# Patient Record
Sex: FEMALE | Race: WHITE | Employment: FULL TIME | URBAN - METROPOLITAN AREA
[De-identification: names, ages, dates, MRNs, and addresses within clinical notes are randomized per-mention and may not be internally consistent; named-entity substitution may affect disease eponyms.]

---

## 2017-04-26 PROCEDURE — 87081 CULTURE SCREEN ONLY: CPT | Performed by: NURSE PRACTITIONER

## 2017-11-02 PROCEDURE — 87389 HIV-1 AG W/HIV-1&-2 AB AG IA: CPT | Performed by: FAMILY MEDICINE

## 2017-11-02 PROCEDURE — 87591 N.GONORRHOEAE DNA AMP PROB: CPT | Performed by: FAMILY MEDICINE

## 2017-11-02 PROCEDURE — 86803 HEPATITIS C AB TEST: CPT | Performed by: FAMILY MEDICINE

## 2017-11-02 PROCEDURE — 86780 TREPONEMA PALLIDUM: CPT | Performed by: FAMILY MEDICINE

## 2017-11-02 PROCEDURE — 87340 HEPATITIS B SURFACE AG IA: CPT | Performed by: FAMILY MEDICINE

## 2017-11-02 PROCEDURE — 87491 CHLMYD TRACH DNA AMP PROBE: CPT | Performed by: FAMILY MEDICINE

## 2017-11-29 PROCEDURE — 88175 CYTOPATH C/V AUTO FLUID REDO: CPT | Performed by: OBSTETRICS & GYNECOLOGY

## 2017-11-29 PROCEDURE — 87624 HPV HI-RISK TYP POOLED RSLT: CPT | Performed by: OBSTETRICS & GYNECOLOGY

## 2018-03-13 PROBLEM — H40.003 GLAUCOMA SUSPECT OF BOTH EYES: Status: ACTIVE | Noted: 2018-03-13

## 2019-05-07 PROBLEM — M54.32 SCIATICA OF LEFT SIDE: Status: ACTIVE | Noted: 2019-05-07

## 2019-05-18 ENCOUNTER — HOSPITAL ENCOUNTER (EMERGENCY)
Facility: HOSPITAL | Age: 44
Discharge: HOME | End: 2019-05-18
Attending: EMERGENCY MEDICINE
Payer: COMMERCIAL

## 2019-05-18 VITALS
SYSTOLIC BLOOD PRESSURE: 98 MMHG | TEMPERATURE: 98 F | OXYGEN SATURATION: 99 % | HEART RATE: 72 BPM | RESPIRATION RATE: 13 BRPM | DIASTOLIC BLOOD PRESSURE: 62 MMHG

## 2019-05-18 DIAGNOSIS — M54.42 ACUTE LEFT-SIDED LOW BACK PAIN WITH LEFT-SIDED SCIATICA: Primary | ICD-10-CM

## 2019-05-18 PROCEDURE — 99283 EMERGENCY DEPT VISIT LOW MDM: CPT

## 2019-05-18 PROCEDURE — 96372 THER/PROPH/DIAG INJ SC/IM: CPT

## 2019-05-18 RX ORDER — CYCLOBENZAPRINE HCL 10 MG
10 TABLET ORAL 3 TIMES DAILY PRN
Qty: 20 TABLET | Refills: 0 | Status: SHIPPED | OUTPATIENT
Start: 2019-05-18 | End: 2019-05-25

## 2019-05-18 RX ORDER — KETOROLAC TROMETHAMINE 30 MG/ML
60 INJECTION, SOLUTION INTRAMUSCULAR; INTRAVENOUS ONCE
Status: COMPLETED | OUTPATIENT
Start: 2019-05-18 | End: 2019-05-18

## 2019-05-18 NOTE — ED INITIAL ASSESSMENT (HPI)
Per patient, \"I've been having sciatic back pain\". Pain for several weeks on the left side and radiates down leg. History of sciatic pain and has already finished a steroid pack from her PCP.

## 2019-05-18 NOTE — ED PROVIDER NOTES
Patient Seen in: [facility] Emergency Department    History   Patient presents with:  Back Pain (musculoskeletal)    Stated Complaint: back pain    HPI    66-year-old female with history of left-sided sciatica presents for evaluation of low back tamia Alert, oriented, no apparent distress  HEENT: Atraumatic, normocephalic. Pupils equal reactive. Extraocular motions intact. Neck: Supple  Lungs: Clear to auscultation bilaterally. Heart: Regular rate and rhythm. Abdomen: Soft, nontender. No mass.   Sanjay Lozoya

## 2019-05-20 ENCOUNTER — TELEPHONE (OUTPATIENT)
Dept: SURGERY | Facility: CLINIC | Age: 44
End: 2019-05-20

## 2019-05-20 ENCOUNTER — HOSPITAL ENCOUNTER (INPATIENT)
Facility: HOSPITAL | Age: 44
LOS: 1 days | Discharge: HOME | DRG: 552 | End: 2019-05-22
Attending: EMERGENCY MEDICINE | Admitting: HOSPITALIST
Payer: COMMERCIAL

## 2019-05-20 ENCOUNTER — APPOINTMENT (OUTPATIENT)
Dept: MRI IMAGING | Facility: HOSPITAL | Age: 44
DRG: 552 | End: 2019-05-20
Attending: EMERGENCY MEDICINE
Payer: COMMERCIAL

## 2019-05-20 DIAGNOSIS — M54.32 SCIATICA OF LEFT SIDE: ICD-10-CM

## 2019-05-20 DIAGNOSIS — M51.26 HERNIATED INTERVERTEBRAL DISC OF LUMBAR SPINE: ICD-10-CM

## 2019-05-20 DIAGNOSIS — M54.59 INTRACTABLE LOW BACK PAIN: Primary | ICD-10-CM

## 2019-05-20 PROCEDURE — 99223 1ST HOSP IP/OBS HIGH 75: CPT | Performed by: ANESTHESIOLOGY

## 2019-05-20 PROCEDURE — 72148 MRI LUMBAR SPINE W/O DYE: CPT | Performed by: EMERGENCY MEDICINE

## 2019-05-20 RX ORDER — ONDANSETRON 2 MG/ML
4 INJECTION INTRAMUSCULAR; INTRAVENOUS EVERY 6 HOURS PRN
Status: DISCONTINUED | OUTPATIENT
Start: 2019-05-20 | End: 2019-05-22

## 2019-05-20 RX ORDER — MORPHINE SULFATE 4 MG/ML
2 INJECTION, SOLUTION INTRAMUSCULAR; INTRAVENOUS EVERY 2 HOUR PRN
Status: DISCONTINUED | OUTPATIENT
Start: 2019-05-20 | End: 2019-05-21

## 2019-05-20 RX ORDER — LORAZEPAM 2 MG/ML
1 INJECTION INTRAMUSCULAR ONCE
Status: COMPLETED | OUTPATIENT
Start: 2019-05-20 | End: 2019-05-20

## 2019-05-20 RX ORDER — CYCLOBENZAPRINE HCL 10 MG
10 TABLET ORAL 3 TIMES DAILY PRN
Status: DISCONTINUED | OUTPATIENT
Start: 2019-05-20 | End: 2019-05-22

## 2019-05-20 RX ORDER — MULTIVITAMIN
1 TABLET ORAL DAILY
COMMUNITY
End: 2021-06-03

## 2019-05-20 RX ORDER — MORPHINE SULFATE 4 MG/ML
4 INJECTION, SOLUTION INTRAMUSCULAR; INTRAVENOUS ONCE
Status: COMPLETED | OUTPATIENT
Start: 2019-05-20 | End: 2019-05-20

## 2019-05-20 RX ORDER — MORPHINE SULFATE 4 MG/ML
4 INJECTION, SOLUTION INTRAMUSCULAR; INTRAVENOUS EVERY 2 HOUR PRN
Status: DISCONTINUED | OUTPATIENT
Start: 2019-05-20 | End: 2019-05-21

## 2019-05-20 RX ORDER — HYDROCODONE BITARTRATE AND ACETAMINOPHEN 5; 325 MG/1; MG/1
1 TABLET ORAL EVERY 4 HOURS PRN
Status: DISCONTINUED | OUTPATIENT
Start: 2019-05-20 | End: 2019-05-21

## 2019-05-20 RX ORDER — LEVOTHYROXINE SODIUM 88 UG/1
88 TABLET ORAL
Status: DISCONTINUED | OUTPATIENT
Start: 2019-05-20 | End: 2019-05-22

## 2019-05-20 RX ORDER — ONDANSETRON 2 MG/ML
4 INJECTION INTRAMUSCULAR; INTRAVENOUS ONCE
Status: COMPLETED | OUTPATIENT
Start: 2019-05-20 | End: 2019-05-20

## 2019-05-20 NOTE — PROGRESS NOTES
[facility]  Report of Consultation    Sheri Wei Patient Status:  Observation    1975 MRN [id number]   [facility] 3SW-A Attending Marjorie Resendiz MD   Hosp Day # 0 PCP Leona Cleveland DO     Date of Admission:  2019  Ricardo (NORCO) 5-325 MG per tab 1 tablet, 1 tablet, Oral, Q4H PRN  •  Levothyroxine Sodium (SYNTHROID, LEVOTHROID) tab 88 mcg, 88 mcg, Oral, Before breakfast    Review of Systems:  Pertinent items are noted in HPI.     Physical Exam:  Blood pressure 104/59, pulse posterior displacement of the descending left L5 nerve root. Clinical correlation for a left L5 radiculopathy is suggested. Mild facet hypertrophy. Minimal central canal stenosis.   The   disc protrusion also abuts the right L5 nerve root but without sig

## 2019-05-20 NOTE — H&P
[facility] Hospitalist History and Physical      Patient presents with:  Back Pain (musculoskeletal)       PCP: Shannon Villalobos DO      History of Present Illness: Patient is a 37year old female with PMH sig for hypothyroidism and known sciatica presented to the Fam Hx  Family History   Problem Relation Age of Onset   • Cancer Father         Lung Cancer,    • Other (Other) Mother         COPD       Review of Systems  Comprehensive ROS reviewed and negative except for what is stated in HPI.       OBJECTI Isa Rosen MD            Result Date: 5/20/2019  PROCEDURE:  MRI SPINE LUMBAR (CPT=72148)  COMPARISON:  None. INDICATIONS:  eval for radicular symptoms  TECHNIQUE:  Multiplanar T1 and T2 weighted images including fat suppression sequences.   Images descending left L5 nerve root. Clinical correlation for right L2 and/or left L5 radiculopathy is suggested. The bladder is  prominent size. Clinical correlation for urinary retention is recommended.   Please see above for further details regarding the de

## 2019-05-20 NOTE — ED PROVIDER NOTES
Patient Seen in: [facility] Emergency Department    History   Patient presents with:  Back Pain (musculoskeletal)    Stated Complaint: back pain    HPI    42-year-old female presents emergency department who had called the ambulance because she was Patient is alert and in moderate to severe pain distress  HEENT: Pupils equal react to light extraocular muscles intact no scleral icterus, mucous membranes are moist, there is no erythema or exudate in the posterior pharynx  Neck: Supple no JVD no lymphad the descending left L5 nerve root. Clinical correlation for right L2 and/or left L5 radiculopathy is suggested. The bladder is  prominent size. Clinical correlation for urinary retention is recommended.   Please see above for further details regarding th

## 2019-05-20 NOTE — TELEPHONE ENCOUNTER
Spoke with Dr. Belinda Bhardwaj who is willing to do the left S1 selective nerve root block requested by [facility] spine on Wednesday.

## 2019-05-20 NOTE — PROGRESS NOTES
Dr. Peri Miller seen patient at the bedside. He discussed MRI results with patient. Received order to consult pain service for [facility] with specific order for Left S1 Selective Nerve Root Block.   Called Dr. Sami Ly Pain service office, notifie

## 2019-05-20 NOTE — ED INITIAL ASSESSMENT (HPI)
Pt presents to ED via EMS from home with back pain. Pt reports being seen in this ED yesterday. Given prescriptions for tramadol and flexeril which she is taking with little relief.  Pt received 70 mcg fentanyl en route by EMS

## 2019-05-20 NOTE — CONSULTS
Patient: Korin Vásquez  Medical Record Number: [id number]  Referring Physician:  No ref.  provider found  PCP: Fern Rodríguez DO      HISTORY OF CHIEF COMPLAINT:    Korin Vásquez is a 37year old female, who comes today to discuss low back issues Years of education: Not on file      Highest education level: Not on file    Tobacco Use      Smoking status: Former Smoker        Packs/day: 1.00        Years: 12.00        Pack years: 15        Quit date: 2007        Years since quittin.9 LYMPH EXAM: There is no edema in bilateral lower extremities. VASCULAR EXAM:  pulses are normal bilaterally, with good distal perfusion. No clubbing or cyanosis.  Calves supple, NT   ABDOMINAL EXAM: Abdomen is soft, NT.  MUSCULOSKELETAL: Outlined above

## 2019-05-20 NOTE — PROGRESS NOTES
NURSING ADMISSION NOTE      Patient admitted via Cart  Oriented to room. Received awake, alert and oriented x 4. Safety precautions initiated. Bed in low position.  at bedside. Call light in reach. Instructed to call for assistance.   Verbal

## 2019-05-20 NOTE — ED NOTES
States needs to urinate. Once got to the end of the bed but then was unable to stand up. Slid back to sit on back. Given towels to support under left knee.

## 2019-05-20 NOTE — ED NOTES
Went to bathroom. States had a lot of pain from squatting/sitting on the toilet. Pain is now 9/10. Dr. Juliano Chacon notified of increase in pain. Orders received.

## 2019-05-20 NOTE — PROGRESS NOTES
Dr. Analia Gross Pain service to see patient this afternoon to discuss JAYLYN. Patient updated on plan of care. PAU Jeffrey 115 Dr. Analia Gross is here now to see patient. Discussed plan for JAYLYN. Plan for [facility] tomorrow approximately around noon by Dr. Luci Lopez through IR.

## 2019-05-21 ENCOUNTER — APPOINTMENT (OUTPATIENT)
Dept: INTERVENTIONAL RADIOLOGY/VASCULAR | Facility: HOSPITAL | Age: 44
DRG: 552 | End: 2019-05-21
Attending: ANESTHESIOLOGY
Payer: COMMERCIAL

## 2019-05-21 PROCEDURE — 3E0R3BZ INTRODUCTION OF ANESTHETIC AGENT INTO SPINAL CANAL, PERCUTANEOUS APPROACH: ICD-10-PCS | Performed by: ANESTHESIOLOGY

## 2019-05-21 PROCEDURE — 3E0R33Z INTRODUCTION OF ANTI-INFLAMMATORY INTO SPINAL CANAL, PERCUTANEOUS APPROACH: ICD-10-PCS | Performed by: ANESTHESIOLOGY

## 2019-05-21 PROCEDURE — 64483 NJX AA&/STRD TFRM EPI L/S 1: CPT | Performed by: ANESTHESIOLOGY

## 2019-05-21 RX ORDER — LIDOCAINE HYDROCHLORIDE 10 MG/ML
INJECTION, SOLUTION INFILTRATION; PERINEURAL
Status: COMPLETED
Start: 2019-05-21 | End: 2019-05-21

## 2019-05-21 RX ORDER — POLYETHYLENE GLYCOL 3350 17 G/17G
17 POWDER, FOR SOLUTION ORAL DAILY PRN
Status: DISCONTINUED | OUTPATIENT
Start: 2019-05-21 | End: 2019-05-22

## 2019-05-21 RX ORDER — HYDROCODONE BITARTRATE AND ACETAMINOPHEN 10; 325 MG/1; MG/1
1 TABLET ORAL EVERY 4 HOURS PRN
Status: DISCONTINUED | OUTPATIENT
Start: 2019-05-21 | End: 2019-05-22

## 2019-05-21 RX ORDER — KETOROLAC TROMETHAMINE 30 MG/ML
30 INJECTION, SOLUTION INTRAMUSCULAR; INTRAVENOUS ONCE
Status: COMPLETED | OUTPATIENT
Start: 2019-05-21 | End: 2019-05-21

## 2019-05-21 RX ORDER — METHYLPREDNISOLONE ACETATE 80 MG/ML
INJECTION, SUSPENSION INTRA-ARTICULAR; INTRALESIONAL; INTRAMUSCULAR; SOFT TISSUE
Status: COMPLETED
Start: 2019-05-21 | End: 2019-05-21

## 2019-05-21 RX ORDER — HYDROMORPHONE HYDROCHLORIDE 1 MG/ML
0.5 INJECTION, SOLUTION INTRAMUSCULAR; INTRAVENOUS; SUBCUTANEOUS EVERY 2 HOUR PRN
Status: DISCONTINUED | OUTPATIENT
Start: 2019-05-21 | End: 2019-05-22

## 2019-05-21 RX ORDER — DOCUSATE SODIUM 100 MG/1
100 CAPSULE, LIQUID FILLED ORAL 2 TIMES DAILY
Status: DISCONTINUED | OUTPATIENT
Start: 2019-05-21 | End: 2019-05-22

## 2019-05-21 RX ORDER — HYDROCODONE BITARTRATE AND ACETAMINOPHEN 10; 325 MG/1; MG/1
2 TABLET ORAL EVERY 4 HOURS PRN
Status: DISCONTINUED | OUTPATIENT
Start: 2019-05-21 | End: 2019-05-22

## 2019-05-21 RX ORDER — MIDAZOLAM HYDROCHLORIDE 1 MG/ML
INJECTION INTRAMUSCULAR; INTRAVENOUS
Status: COMPLETED
Start: 2019-05-21 | End: 2019-05-21

## 2019-05-21 NOTE — PROGRESS NOTES
Patient back to her room post Left Sacral 1 Selective Root block done by Dr. Tiki Roberts. Transported by bed, complaint of having left buttock pain that shoots down to her left lower leg. Morphine 4 mg IV given.   Instructed patient to be on bedrest for 2 hours

## 2019-05-21 NOTE — PHYSICAL THERAPY NOTE
Chart reviewed. PT order noted. Pt scheduled for S1 nerve block this pm. Nursing recommends hold until after procedure. Will check back later as schedule permits.

## 2019-05-21 NOTE — PLAN OF CARE
Problem: Patient/Family Goals  Goal: Patient/Family Long Term Goal  Description  Patient's Long Term Goal: to respond well to the medications a     Interventions:  -pain medication,comply with plan of care.   - See additional Care Plan goals for specific

## 2019-05-21 NOTE — PROGRESS NOTES
[facility] Hospitalist Progress Note                                                                   John Mejia 89  6/25/1975    SUBJECTIVE:  Pt seen and examined.   Had JAYLYN today with acute L lumbar JAYLYN  - further pain control per pain svc (PRN IV dilaudid)  - monitor overnight      #Hypothyroidism   - cont synthroid     Dispo: inpt care. Likely discharge home tomorrow if pain controlled on oral medication.       Andrey Colby

## 2019-05-21 NOTE — CONSULTS
Name: Kaitlynn Veloz   : 1975   DOS: 2019     Chief complaint: Low back pain    History of present illness:  Kaitlynn Veloz is a 37year old female complaining of  pain in the lower back for 2 years.   Initially pain started with low-grad of  other systems:  Constitutional: negative for fever, weight loss and malaise/fatigue  Cardiovascular:  Denies shortness of breath, chest pain, dyspnea on exertion, ischemia in extremities. Endocrine: Negative.  Specifically denies thyroid disorder, diab Examination:    (R)   (L)  Deltoid:      5    5  Biceps:       5    5  Triceps:      5    5  Wrist Extension:     5    5  Wrist Flexsion:                 5    5  Finger Extension:     5    5  Finger Flexsion:     5    5    Deep Tendon Reflexes: N   L4:      N                          N   L5:      N                          N    S1:      N                          N   S3:      N                          N    Radiology diagnostic studies: MRI of lumbar spine was reviewed.   MRI of lumbar spin

## 2019-05-21 NOTE — PROGRESS NOTES
Dr. Antonio Johnson notified that patient had no relief with steroid injection and with Morphine IV given. Received order to give Dilaudid 0.5 mg IV and Toradol 30 mg IV x 1 dose only.   Per pain service it will take 48 hours for steroid injection to be absorb by the

## 2019-05-21 NOTE — PROGRESS NOTES
Scheduled for LESI today around noon by Dr. Lizeth Fulton. Consent obtained for the procedure. Instructed to be NPO but may have sips of water with medication. Complaint of severe sharp pain to left buttocks that shoot down to her left lower leg.   Morphine 4 mg

## 2019-05-21 NOTE — OPERATIVE REPORT
[facility]  Operative Report  2019     Cherrie Mathew Patient Status:  Observation    1975 MRN [id number]   [facility] 3SW-A Attending Chris Thayer MD   Hosp Day # 0 PCP Awais Garibay DO     Indication: Cristy Abraham is a 37 ye and blood, approximately 1 cc of Omnipaque 240 was injected. An excellent contrast spread along the nerve root was obtained. At this point, 2 cc of normal saline with 80 mg of DepoMedrol was injected without complication.   Additionally, 3 cc of 1% lidoca

## 2019-05-22 VITALS
HEART RATE: 66 BPM | TEMPERATURE: 98 F | RESPIRATION RATE: 16 BRPM | WEIGHT: 130 LBS | DIASTOLIC BLOOD PRESSURE: 57 MMHG | OXYGEN SATURATION: 97 % | SYSTOLIC BLOOD PRESSURE: 94 MMHG | BODY MASS INDEX: 20.4 KG/M2 | HEIGHT: 67 IN

## 2019-05-22 RX ORDER — BISACODYL 10 MG
10 SUPPOSITORY, RECTAL RECTAL
Status: DISCONTINUED | OUTPATIENT
Start: 2019-05-22 | End: 2019-05-22

## 2019-05-22 RX ORDER — DOCUSATE SODIUM 100 MG/1
100 CAPSULE, LIQUID FILLED ORAL 2 TIMES DAILY PRN
Qty: 60 CAPSULE | Refills: 0 | Status: SHIPPED | COMMUNITY
Start: 2019-05-22 | End: 2020-01-28

## 2019-05-22 RX ORDER — SENNA AND DOCUSATE SODIUM 50; 8.6 MG/1; MG/1
2 TABLET, FILM COATED ORAL 2 TIMES DAILY
Status: DISCONTINUED | OUTPATIENT
Start: 2019-05-22 | End: 2019-05-22

## 2019-05-22 NOTE — PHYSICAL THERAPY NOTE
PHYSICAL THERAPY EVALUATION - INPATIENT     Room Number: 357/357-A  Evaluation Date: 5/22/2019  Type of Evaluation: Initial  Physician Order: PT Eval and Treat    Presenting Problem: Lumbar Radiculopathy - S/p Left S1 selective Nerve root block on 05 Spine  Fall Risk: Standard fall risk    WEIGHT BEARING RESTRICTION  Weight Bearing Restriction: None                PAIN ASSESSMENT  Ratin  Location: low back, radiating pain to left LE  Management Techniques: Activity promotion; Body mechanics;Breathin log roll technique for supine to sit & sit to supine. Sit to stand was independent - favoring left LE. Patient was able to ambulate 10 ft without AD,then c/o pain in left LE. Ambulated further 150 ft with RW & supervision.  Declined to attempt stairs due to impairment may benefit from OP PT when OK with MD.      PLAN  PT Treatment Plan: Bed mobility; Body mechanics; Endurance; Energy conservation;Patient education;Gait training;Stair training;Transfer training;Balance training  Rehab Potential : Good  Frequency

## 2019-05-22 NOTE — PROGRESS NOTES
[facility] Hospitalist Progress Note                                                                   John Mejia 89  6/25/1975    SUBJECTIVE:   \"My pain is the same\".  Pt hasn't had BM since agreeable to suppository today     #Hypothyroidism   - cont synthroid     Dispo: dc pending pain control.      Narda Cogan, MD  [facility] IM Hospitalist  Pager: [phone]

## 2019-05-22 NOTE — PROGRESS NOTES
Per RN    RECEIVED A CALL FROM DR. Lea Ramachandran . MD NOTIFIED ABOUT PT.S PAIN LEVEL AND PAIN MEDS GIVEN DURING THE NIGHT. MD . PER DR. CERVANTES HE WILL BE IN TO SEE PT. AT 1 PM BUT IF PT. IS DISCHARGED BEFORE THEN, PT. NEEDS TO MAKE AN APPT.  TO SEE HIM AT THE

## 2019-05-22 NOTE — PLAN OF CARE
PT.C/O OF MODERATE LEFT BUTTOCK PAIN 5/10 AFTER AMBULATING SEVERAL TIMES IN THE HALLWAY WITH A WALKER. PAIN MANAGED WITH 2 [address] 10'S. VSS. COVERLET TO LOW BACK CDI. SAFETY MEASURES IN PLACE.

## 2019-05-22 NOTE — PLAN OF CARE
Plan of care discussed with patient at bedside this am. She states she is waiting to speak with Dr. César Carrion to see what the next step will be. Still having pain to left buttock and down left leg. Mild weakness, no n/t. D/c planning in place.  Voiding freely,

## 2019-05-22 NOTE — PROGRESS NOTES
Office Follow Up      Subjective:     Patient with improved pain control. Able to stand and walk with RW. On Norco 10mg x2  q4 PRN and dilaudid prn. Still in bed.  Asking 2nd injection    Objective:     Unchanged from prior    Imaging:    None    Plan:

## 2019-05-22 NOTE — PAYOR COMM NOTE
--------------  ADMISSION REVIEW     Payor: [facility] #:  [id number]  Authorization Number: [id number]      ED Provider Notes        Patient Seen in: [facility] Emergency Department    History   Patient presents w rub  Respiratory: Clear to auscultation bilaterally no crackles no wheezes no accessory muscle use  Abdomen: Soft nontender nondistended, no rebound no guarding  no hepatosplenomegaly bowel sounds are present , no pulsatile mass  Extremities: No clubbing c pain  (primary encounter diagnosis)  Sciatica of left side  Herniated intervertebral disc of lumbar spine                 H&P - H&P Note        History of Present Illness: Patient is a 37year old female with PMH sig for hypothyroidism and known sciatica p severe low back pain radiating to the LLE.      #Acute low back pain with L sided radiculopathy due to herniated nucleus pulposis   - appreciate spine surgery and pain svc rec  - cont IV morphine PRN, wean to po norco as tolerated  - PT eval  - epidural inj contraindications such as infection and coagulopathy were ruled out.   Following review of potential side effects and complications, including but not necessarily limited to infection, allergic reaction, local tissue breakdown, nerve injury, and paresis, th °F (37.3 °C)] 98.3 °F (36.8 °C)  Pulse:  [47-63] 55  Resp:  [18] 18  BP: ()/(49-65) 97/53    #Acute low back pain with L sided radiculopathy due to herniated nucleus pulposis   - appreciate spine surgery and pain svc rec  - now s/p lumbar JAYLYN  - furt

## 2019-05-22 NOTE — PROGRESS NOTES
Rn spoke with Miley Levine from pain service who spoke with Dr. Tanika Stephenson, and someone from [facility] pain service is to see patient today. Rn notified patient and her spouse of above. Will cont to monitor.

## 2019-05-23 ENCOUNTER — APPOINTMENT (OUTPATIENT)
Dept: GENERAL RADIOLOGY | Facility: HOSPITAL | Age: 44
End: 2019-05-23
Attending: EMERGENCY MEDICINE
Payer: COMMERCIAL

## 2019-05-23 PROCEDURE — 71045 X-RAY EXAM CHEST 1 VIEW: CPT | Performed by: EMERGENCY MEDICINE

## 2019-05-23 NOTE — ED PROVIDER NOTES
Patient Seen in: [facility] Emergency Department    History   Patient presents with:   Anxiety/Panic attack (neurologic)    Stated Complaint: hand cramping    HPI    28-year-old female comes to the hospital with a complaint of having difficulty with 100 %   O2 Device None (Room air)       Current:BP 95/72   Pulse 64   Temp 98.1 °F (36.7 °C) (Tympanic)   Resp 16   Ht 172.7 cm (5' 8\")   Wt 59 kg   LMP 05/16/2019   SpO2 99%   BMI 19.77 kg/m²         Physical Exam    HEENT : NCAT, EOMI, PEERL, throat henrietta is suggested. The L5-S1 disc protrusion posteriorly displaces the descending left S1  nerve root. Clinical correlation for a left S1 radiculopathy is suggested.    Dictated by: Marcie Brasher MD on 5/20/2019 at 9:58     Approved by: Sierra Kay right L5 nerve root but without significant displacement. Mild bilateral foraminal narrowing.       CONCLUSION:  There are prominent disc protrusions at L2-L3 and L5-S1 which may impinge the descending right L2 nerve root and the descending left L5 nerve r medications    ALPRAZolam 0.25 MG Oral Tab  Take 1 tablet (0.25 mg total) by mouth 3 (three) times daily as needed for Anxiety.   Qty: 20 tablet Refills: 0

## 2019-05-23 NOTE — ED INITIAL ASSESSMENT (HPI)
Pt states she developed cramping to both hands, possibly hyperventilation.  Been mekhi lot stress lately

## 2019-05-23 NOTE — PROGRESS NOTES
Hugh Myers here to see patient and patient stating she feels better and wants to go home. Ok to d/c per Dr. Joanie Perez and Hugh Myers, she can follow up with pain service as outpatient. Norco script given to patient per Hugh Myers this evening.  Dr. Joanie Perez un

## 2019-05-29 PROBLEM — M54.16 LUMBAR RADICULITIS: Status: ACTIVE | Noted: 2019-05-29

## 2019-05-29 PROBLEM — M51.26 HNP (HERNIATED NUCLEUS PULPOSUS), LUMBAR: Status: ACTIVE | Noted: 2019-05-20

## 2021-03-23 PROBLEM — D64.89 ANEMIA DUE TO OTHER CAUSE, NOT CLASSIFIED: Status: ACTIVE | Noted: 2021-03-23

## 2021-03-23 PROBLEM — F32.A DEPRESSION, UNSPECIFIED DEPRESSION TYPE: Status: ACTIVE | Noted: 2021-03-23

## 2021-03-23 PROBLEM — F41.9 ANXIETY: Status: ACTIVE | Noted: 2021-03-23

## 2021-05-21 ENCOUNTER — HOSPITAL ENCOUNTER (EMERGENCY)
Facility: HOSPITAL | Age: 46
Discharge: HOME | End: 2021-05-21
Attending: EMERGENCY MEDICINE
Payer: COMMERCIAL

## 2021-05-21 VITALS
RESPIRATION RATE: 16 BRPM | HEIGHT: 67 IN | HEART RATE: 53 BPM | WEIGHT: 140 LBS | DIASTOLIC BLOOD PRESSURE: 72 MMHG | SYSTOLIC BLOOD PRESSURE: 113 MMHG | TEMPERATURE: 98 F | OXYGEN SATURATION: 99 % | BODY MASS INDEX: 21.97 KG/M2

## 2021-05-21 DIAGNOSIS — M51.9 LUMBAR DISC DISEASE: Primary | ICD-10-CM

## 2021-05-21 DIAGNOSIS — M54.40 BACK PAIN OF LUMBAR REGION WITH SCIATICA: ICD-10-CM

## 2021-05-21 PROCEDURE — 99284 EMERGENCY DEPT VISIT MOD MDM: CPT

## 2021-05-21 PROCEDURE — 96374 THER/PROPH/DIAG INJ IV PUSH: CPT

## 2021-05-21 PROCEDURE — 96375 TX/PRO/DX INJ NEW DRUG ADDON: CPT

## 2021-05-21 RX ORDER — HYDROCODONE BITARTRATE AND ACETAMINOPHEN 5; 325 MG/1; MG/1
1-2 TABLET ORAL EVERY 4 HOURS PRN
Qty: 10 TABLET | Refills: 0 | Status: SHIPPED | OUTPATIENT
Start: 2021-05-21 | End: 2021-06-03

## 2021-05-21 RX ORDER — HYDROCODONE BITARTRATE AND ACETAMINOPHEN 5; 325 MG/1; MG/1
2 TABLET ORAL ONCE
Status: COMPLETED | OUTPATIENT
Start: 2021-05-21 | End: 2021-05-21

## 2021-05-21 RX ORDER — METHYLPREDNISOLONE 4 MG/1
TABLET ORAL
Qty: 1 PACKAGE | Refills: 0 | Status: SHIPPED | OUTPATIENT
Start: 2021-05-21 | End: 2021-06-15 | Stop reason: ALTCHOICE

## 2021-05-21 RX ORDER — KETOROLAC TROMETHAMINE 30 MG/ML
15 INJECTION, SOLUTION INTRAMUSCULAR; INTRAVENOUS ONCE
Status: COMPLETED | OUTPATIENT
Start: 2021-05-21 | End: 2021-05-21

## 2021-05-21 RX ORDER — ONDANSETRON 2 MG/ML
4 INJECTION INTRAMUSCULAR; INTRAVENOUS ONCE
Status: COMPLETED | OUTPATIENT
Start: 2021-05-21 | End: 2021-05-21

## 2021-05-21 RX ORDER — CYCLOBENZAPRINE HCL 10 MG
10 TABLET ORAL 3 TIMES DAILY PRN
Qty: 20 TABLET | Refills: 0 | Status: SHIPPED | OUTPATIENT
Start: 2021-05-21 | End: 2021-06-03

## 2021-05-21 RX ORDER — HYDROMORPHONE HYDROCHLORIDE 1 MG/ML
1 INJECTION, SOLUTION INTRAMUSCULAR; INTRAVENOUS; SUBCUTANEOUS ONCE
Status: COMPLETED | OUTPATIENT
Start: 2021-05-21 | End: 2021-05-21

## 2021-05-21 NOTE — ED QUICK NOTES
Patient able to ambulate with much less pain. Patient states that before \"I could only walk for 20 seconds, now I can actually walk longer\". Patient able to ambulate with steady gait without assistance. MD and RN notified.

## 2021-05-21 NOTE — ED PROVIDER NOTES
Patient Seen in: [facility] Emergency Department      History   Patient presents with:  Back Pain    Stated Complaint: back pain x 3 days. no trauma    HPI/Subjective:   HPI    Patient is a 42-year-old woman who presents with lower back pain.   Worse Well-appearing patient of stated age resting comfortably  HEENT: Normocephalic atraumatic. Nonicteric sclera. Moist mucous membranes  Lungs: No tachypnea  Cardiac: No tachycardia  Skin: No rashes, pallor  Neuro: No focal deficits.   Extremities: Good stre

## 2021-06-03 PROCEDURE — 88304 TISSUE EXAM BY PATHOLOGIST: CPT | Performed by: ORTHOPAEDIC SURGERY

## 2021-06-03 PROCEDURE — 88311 DECALCIFY TISSUE: CPT | Performed by: ORTHOPAEDIC SURGERY

## 2021-06-08 PROBLEM — F17.200 SMOKER: Status: ACTIVE | Noted: 2021-06-08

## (undated) NOTE — ED AVS SNAPSHOT
Cordova Eriberto   MRN: [id number]    Department:  [facility] Emergency Department   Date of Visit:  5/23/2019           Disclosure     Insurance plans vary and the physician(s) referred by the ER may not be covered by your plan.  Please contact y tell this physician (or your personal doctor if your instructions are to return to your personal doctor) about any new or lasting problems. The primary care or specialist physician will see patients referred from the [facility] Emergency Department.  Arpit Pace

## (undated) NOTE — ED AVS SNAPSHOT
Kaitlynn Veloz   MRN: [id number]    Department:  [facility] Emergency Department   Date of Visit:  5/18/2019           Disclosure     Insurance plans vary and the physician(s) referred by the ER may not be covered by your plan.  Please contact y tell this physician (or your personal doctor if your instructions are to return to your personal doctor) about any new or lasting problems. The primary care or specialist physician will see patients referred from the [facility] Emergency Department.  Rosemary Dewey